# Patient Record
Sex: FEMALE | Race: WHITE | NOT HISPANIC OR LATINO | Employment: OTHER | URBAN - METROPOLITAN AREA
[De-identification: names, ages, dates, MRNs, and addresses within clinical notes are randomized per-mention and may not be internally consistent; named-entity substitution may affect disease eponyms.]

---

## 2017-02-07 ENCOUNTER — THERAPY VISIT (OUTPATIENT)
Dept: CHIROPRACTIC MEDICINE | Facility: CLINIC | Age: 52
End: 2017-02-07
Payer: COMMERCIAL

## 2017-02-07 DIAGNOSIS — M76.899 HAMSTRING TENDINITIS AT ORIGIN: ICD-10-CM

## 2017-02-07 DIAGNOSIS — M99.05 SOMATIC DYSFUNCTION OF PELVIS REGION: Primary | ICD-10-CM

## 2017-02-07 DIAGNOSIS — M25.551 HIP PAIN, RIGHT: ICD-10-CM

## 2017-02-07 DIAGNOSIS — M25.551 PAIN IN JOINT, PELVIC REGION AND THIGH, RIGHT: ICD-10-CM

## 2017-02-07 DIAGNOSIS — M79.10 MYALGIA: ICD-10-CM

## 2017-02-07 PROCEDURE — 98940 CHIROPRACT MANJ 1-2 REGIONS: CPT | Mod: AT | Performed by: CHIROPRACTOR

## 2017-02-07 PROCEDURE — 97110 THERAPEUTIC EXERCISES: CPT | Performed by: CHIROPRACTOR

## 2017-02-07 NOTE — MR AVS SNAPSHOT
"              After Visit Summary   2/7/2017    Qing Norris    MRN: [id number]           Patient Information     Date Of Birth          1965        Visit Information        Provider Department      2/7/2017 8:00 AM Mac Santos DC [place] [facility] - [facility] Chirop[facility]        Today's Diagnoses     Somatic dysfunction of pelvis region    -  1     Hip pain, right         Hamstring tendinitis at origin         Myalgia         Pain in joint, pelvic region and thigh, right            Follow-ups after your visit        Who to contact     If you have questions or need follow up information about today's clinic visit or your schedule please contact [facility]THLETIC [facility] directly at [phone].  Normal or non-critical lab and imaging results will be communicated to you by NetHookshart, letter or phone within 4 business days after the clinic has received the results. If you do not hear from us within 7 days, please contact the clinic through NetHookshart or phone. If you have a critical or abnormal lab result, we will notify you by phone as soon as possible.  Submit refill requests through Youxiduo or call your pharmacy and they will forward the refill request to us. Please allow 3 business days for your refill to be completed.          Additional Information About Your Visit        NetHooksharMotobuykers Information     Youxiduo lets you send messages to your doctor, view your test results, renew your prescriptions, schedule appointments and more. To sign up, go to [email or web address] . Click on \"Log in\" on the left side of the screen, which will take you to the Welcome page. Then click on \"Sign up Now\" on the right side of the page.     You will be asked to enter the access code listed below, as well as some personal information. Please follow the directions to create your username and password.     Your access code is: [email or web address]  Expires: 3/4/2017  8:12 AM     Your " access code will  in 90 days. If you need help or a new code, please call your [facility] clinic or [phone].        Care EveryWhere ID     This is your Care EveryWhere ID. This could be used by other organizations to access your [facility] medical records  [id number]         Blood Pressure from Last 3 Encounters:   16 154/94   07/29/15 110/60    Weight from Last 3 Encounters:   16 52.164 kg (115 lb)   07/29/15 52.164 kg (115 lb)              We Performed the Following     CHIROPRAC MANIP,SPINAL,1-2 REGIONS     THERAPEUTIC EXERCISES        Primary Care Provider Office Phone # Fax #    Keith Mckeon PA-C [phone]       [facility] [address]    [facility] [address]        Thank you!     Thank you for choosing [place] [facility]  [facility] [place] CH[facility]OPRA[facility]  for your care. Our goal is always to provide you with excellent care. Hearing back from our patients is one way we can continue to improve our services. Please take a few minutes to complete the written survey that you may receive in the mail after your visit with us. Thank you!             Your Updated Medication List - Protect others around you: Learn how to safely use, store and throw away your medicines at www.disposemymeds.org.          This list is accurate as of: 17  8:54 AM.  Always use your most recent med list.                   Brand Name Dispense Instructions for use    calcium carbonate 500 MG tablet    OS-EVANGELISTA 500 mg [place]. Ca     Take 500 mg by mouth 2 times daily       calcium polycarbophil 625 MG tablet    FIBERCON     Take 2 tablets by mouth daily       GLUCOSAMINE SULFATE PO          magnesium chloride 535 (64 MG) MG Tbcr CR tablet      Take 535 mg by mouth daily       OMEGA-3 FISH OIL PO          VITAMIN C PO          VITAMIN D (CHOLECALCIFEROL) PO      Take by mouth daily

## 2017-02-07 NOTE — PROGRESS NOTES
Subjective:  CC: B hips, B hamstring, B adductors   Visit: 25  Functional goal: be able to run, sit for 30 minutes without high hamstring discomfort   Location: general posterior hips and hamstring   Comes in today doing OK. Had headache from Nitro patches so went in and saw Dr. Lees. Notes though that got reading glasses and that helps. Notes doing PT with eccentric exercises. Notes working with [facility] for PT. Notes still not able to run fully without pain. Did run / walk. Notes B proximal adductors. Doing 45 minutes of cardio. Notes playing tennis fully. Still seeing Zaira for dry needling. Wants to go see Rafael as well for needling. I talked with her about seeing so many people and she notes it helps. I told it is hard to have treatment plans because she sees so many people. She agreed to this. Would like me to focus on bilateral lower legs.     Objective:  Inspection:  No SDD  No Scars  Normal Gait  No swelling     Palpation:  Palpable soreness over bilateral lower legs, B proximal adductors, B proximal hamstrings   Myofascitis 2/4 noted over B hamstrings, B proximal adductors     ROM:  Lumbar flexion   90/90 with tightness noted on B hamstring  Hip IR limited on left 6 degrees with no pain  Hamstring SLR shows mid hamstring discomfort at 70 degrees B  Hip flexion 5/5 with no pain  Hip abduction symmetric with discomfort over proximal adductors     MMT:  Left glute med 4/5with no pain  Right glute med 4/5 with no pain  TFL 4/5 B with no pain   Gracilis 5/5 B with mild discomfort over proximal adductors  Psoas 5/5 with anterior hip discomfort   Hamstring 4/5 with discomfort on right    MET:  Right short leg    Squat:  Shift to right  Foot flare to right  Arm drop on right  Early heel rise    NAL:  General SI restrictions B    Assessment:  NAL with associated myofascitis and weakness     Plan:    Patient tolerated treatment well today  Treatment Time: 45 minutes  84630 manipulation 1-2 segments: Hip  LAD B  67393 manipulation: Manual extremity  10931 Manual therapy: (ART, Graston, Strain Counter Strain, Fascial Manipulation, Cupping) performed over area of Bilateral hamstring and Bilateral lower legs  59065 therapeutic exercise (30 minutes):  Gastroc stretch, soleus stretch, hamstring PIR, hip ER stretching, eccentric calfs, clam shell exercises , inhibit with soft tissue inhibition with foam roller, stretch soleus, activate anterior tibialis, psoas stretch, prone hip extension, eccentric calf, Active Isolated Stretching of hamstrings : single leg RDL, straight leg bridges, lateral band walks, fire hydrant, donkey kicks    Today: review of PT exercises, nerve flossing, standing RDL, side lunge stretch  Strapping: inferior glide of hip   Shock wave: 20/12 over proximal hamstring (did not do today)  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

## 2017-07-21 ENCOUNTER — THERAPY VISIT (OUTPATIENT)
Dept: CHIROPRACTIC MEDICINE | Facility: CLINIC | Age: 52
End: 2017-07-21
Payer: COMMERCIAL

## 2017-07-21 DIAGNOSIS — M76.32 ILIOTIBIAL BAND SYNDROME OF BOTH SIDES: ICD-10-CM

## 2017-07-21 DIAGNOSIS — M79.10 MYALGIA: ICD-10-CM

## 2017-07-21 DIAGNOSIS — M76.31 ILIOTIBIAL BAND SYNDROME OF BOTH SIDES: ICD-10-CM

## 2017-07-21 DIAGNOSIS — M99.05 SOMATIC DYSFUNCTION OF PELVIS REGION: Primary | ICD-10-CM

## 2017-07-21 PROCEDURE — 97110 THERAPEUTIC EXERCISES: CPT | Performed by: CHIROPRACTOR

## 2017-07-21 PROCEDURE — 98940 CHIROPRACT MANJ 1-2 REGIONS: CPT | Mod: AT | Performed by: CHIROPRACTOR

## 2017-07-21 PROCEDURE — 99212 OFFICE O/P EST SF 10 MIN: CPT | Mod: 25 | Performed by: CHIROPRACTOR

## 2017-07-21 NOTE — MR AVS SNAPSHOT
"              After Visit Summary   2017    Qing Norris    MRN: [id number]           Patient Information     Date Of Birth          1965        Visit Information        Provider Department      2017 4:00 PM Mac Santos DC [employer] [facility] - [place] Chirop[facility]        Today's Diagnoses     Somatic dysfunction of pelvis region    -  1    Iliotibial band syndrome of both sides        Myalgia           Follow-ups after your visit        Who to contact     If you have questions or need follow up information about today's clinic visit or your schedule please contact [facility] [place] [facility] directly at [phone].  Normal or non-critical lab and imaging results will be communicated to you by OneSunhart, letter or phone within 4 business days after the clinic has received the results. If you do not hear from us within 7 days, please contact the clinic through OneSunhart or phone. If you have a critical or abnormal lab result, we will notify you by phone as soon as possible.  Submit refill requests through Ambient Clinical Analytics or call your pharmacy and they will forward the refill request to us. Please allow 3 business days for your refill to be completed.          Additional Information About Your Visit        MyChart Information     Ambient Clinical Analytics lets you send messages to your doctor, view your test results, renew your prescriptions, schedule appointments and more. To sign up, go to [email or web address] . Click on \"Log in\" on the left side of the screen, which will take you to the Welcome page. Then click on \"Sign up Now\" on the right side of the page.     You will be asked to enter the access code listed below, as well as some personal information. Please follow the directions to create your username and password.     Your access code is: [email or web address]  Expires: 10/20/2017 10:29 PM     Your access code will  in 90 days. If you need help or a new code, " please call your [facility] clinic or [phone].        Care EveryWhere ID     This is your Care EveryWhere ID. This could be used by other organizations to access your [facility] medical records  [id number]         Blood Pressure from Last 3 Encounters:   09/22/16 (!) 154/94   07/29/15 110/60    Weight from Last 3 Encounters:   09/22/16 52.2 kg (115 lb)   07/29/15 52.2 kg (115 lb)              We Performed the Following     CHIROPRAC MANIP,SPINAL,1-2 REGIONS     OFFICE/OUTPT VISIT,EST,LEVL II     THERAPEUTIC EXERCISES        Primary Care Provider Office Phone # Fax #    Keith Mckeon PA-C [phone]       [facility] [address]    [facility] [address]        Equal Access to Services     TOMASA BELTRAN : Hadii jeromy ku hadasho Soomaali, waaxda luqadaha, qaybta kaalmada adeegyada, amari paz . So [facility] [phone].    ATENCIÓN: Si habla español, tiene a spears disposición servicios gratuitos de asistencia lingüística. Nicolás al [phone].    We comply with applicable federal civil rights laws and [place] laws. We do not discriminate on the basis of race, color, national origin, age, disability sex, sexual orientation or gender identity.            Thank you!     Thank you for choosing [place] [facility]  [facility]  for your care. Our goal is always to provide you with excellent care. Hearing back from our patients is one way we can continue to improve our services. Please take a few minutes to complete the written survey that you may receive in the mail after your visit with us. Thank you!             Your Updated Medication List - Protect others around you: Learn how to safely use, store and throw away your medicines at www.disposemymeds.org.          This list is accurate as of: 7/21/17 11:59 PM.  Always use your most recent med list.                   Brand Name Dispense Instructions for use Diagnosis    calcium  carbonate 1250 MG tablet    OS-EVANGELISTA 500 mg [place]. Ca     Take 500 mg by mouth 2 times daily        calcium polycarbophil 625 MG tablet    FIBERCON     Take 2 tablets by mouth daily        GLUCOSAMINE SULFATE PO           magnesium chloride 535 (64 MG) MG Tbcr CR tablet      Take 535 mg by mouth daily        OMEGA-3 FISH OIL PO           VITAMIN C PO           VITAMIN D (CHOLECALCIFEROL) PO      Take by mouth daily

## 2017-07-21 NOTE — PROGRESS NOTES
Subjective:  CC: B IT band pain  Visit: 1  Functional goal: be able to run, stand for 30 minutes without thigh pain, bike for 30 minutes without knee pain  Location: B lateral knee pain, B thigh pain, mid thigh region  Pain: varies but 4/10 on average, worse with biking, has not been running  Previous: since last visit has worked with DC for B adductor strains, has done well. Also sees DC for regular adjustments  Radiation: Denies  MILTON: Denies any specific MILTON, notes gradual onset of pain over last 1.5 months  Other: Denies any changes in health history since last visit. Denies any medications.     Objective:  Inspection:  No SDD  No Scars  Normal Gait  No swelling     Palpation:  Palpable soreness over B thighs, B knees   Myofascitis 2/4 noted over B VL, B IT    ROM:  Lumbar flexion   90/90 with tightness noted on B hamstring  Hip IR limited on left 6 degrees with no pain  Hamstring SLR shows mid hamstring discomfort at 70 degrees B  Hip abduction symmetric with discomfort over proximal adductors   Hip Adduction symmetric and only tightness noted over B IT    MMT:  Left glute med 4/5with no pain  Right glute med 4/5 with no pain  TFL 4/5 B with no pain     MET:  Right short leg    Squat:  Shift to right  Foot flare to right  Arm drop on right  Early heel rise    NAL:  General SI restrictions B    Assessment:  NAL with associated myofascitis and weakness   B IT band pain    Plan:    Patient tolerated treatment well today  Treatment Time: 45 minutes  39832 manipulation 1-2 segments: Hip LAD B  08437 Manual therapy: (ART, Graston, Strain Counter Strain, Fascial Manipulation, Cupping) performed over area of B VL, B BF, B IT  23758 therapeutic exercise (30 minutes):  Gastroc stretch, soleus stretch, hamstring PIR, hip ER stretching, eccentric calfs, clam shell exercises , inhibit with soft tissue inhibition with foam roller, stretch soleus, activate anterior tibialis, psoas stretch, prone hip extension, eccentric calf,  Active Isolated Stretching of hamstrings : single leg RDL, straight leg bridges, lateral band walks, fire hydrant, donkey kicks    Today: TFL stretching, piriformis stretching  Strapping: inferior glide of hip   Next visit: RN as sees so many people and has not been consistent with care in past

## 2017-07-22 PROBLEM — M76.32 ILIOTIBIAL BAND SYNDROME OF BOTH SIDES: Status: ACTIVE | Noted: 2017-07-22

## 2017-07-22 PROBLEM — M99.05 SOMATIC DYSFUNCTION OF PELVIS REGION: Status: ACTIVE | Noted: 2017-07-22

## 2017-07-22 PROBLEM — M76.31 ILIOTIBIAL BAND SYNDROME OF BOTH SIDES: Status: ACTIVE | Noted: 2017-07-22

## 2017-07-22 PROBLEM — M79.10 MYALGIA: Status: ACTIVE | Noted: 2017-07-22

## 2017-12-11 ENCOUNTER — HOSPITAL ENCOUNTER (OUTPATIENT)
Dept: MAMMOGRAPHY | Facility: CLINIC | Age: 52
Discharge: HOME | End: 2017-12-11
Attending: OBSTETRICS & GYNECOLOGY | Admitting: OBSTETRICS & GYNECOLOGY
Payer: COMMERCIAL

## 2017-12-11 DIAGNOSIS — Z12.31 VISIT FOR SCREENING MAMMOGRAM: ICD-10-CM

## 2017-12-11 PROCEDURE — G0202 SCR MAMMO BI INCL CAD: HCPCS

## 2018-03-12 ENCOUNTER — THERAPY VISIT (OUTPATIENT)
Dept: CHIROPRACTIC MEDICINE | Facility: CLINIC | Age: 53
End: 2018-03-12
Payer: COMMERCIAL

## 2018-03-12 DIAGNOSIS — M99.05 SOMATIC DYSFUNCTION OF PELVIS REGION: Primary | ICD-10-CM

## 2018-03-12 DIAGNOSIS — M79.10 MYALGIA: ICD-10-CM

## 2018-03-12 DIAGNOSIS — M79.661 RIGHT CALF PAIN: ICD-10-CM

## 2018-03-12 DIAGNOSIS — M25.561 RIGHT KNEE PAIN: ICD-10-CM

## 2018-03-12 PROCEDURE — 99211 OFF/OP EST MAY X REQ PHY/QHP: CPT | Mod: 25 | Performed by: CHIROPRACTOR

## 2018-03-12 PROCEDURE — 98940 CHIROPRACT MANJ 1-2 REGIONS: CPT | Performed by: CHIROPRACTOR

## 2018-03-12 PROCEDURE — 97110 THERAPEUTIC EXERCISES: CPT | Performed by: CHIROPRACTOR

## 2018-03-12 NOTE — PROGRESS NOTES
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 11/2017 had right foot cramping and right lateral shin pain. Notes that had some right posterior knee pain. MRI at [facility] done that showed nothing. They diagnosed with Popliteus injury and saw Dr. Lees and did some needling in 1/2018. Notes Dr. Lees thought it was more grastroc tendinosis on lateral head. Last week did PT did pubic bone adjustment and notes some adductor discomfort since then. Saw DC and had pubic bone adjusted.     Objective:  Inspection:  No SDD  No Scars  Normal Gait  No swelling     Palpation:  Palpable soreness over R posterior knee, R calf  Myofascitis 2/4 noted over R lateral hamstring, R calf, R popliteus     ROM:  Lumbar flexion   90/90 with tightness noted on B hamstring  Hamstring SLR shows mid hamstring discomfort at 70 degrees B  Hip abduction very limited as patient is nervous to move because she notes it might hurt    MMT:  Left glute med 4/5with no pain  Right glute med 4/5 with no pain  TFL 4/5 B with no pain     MET:  Right short leg    Squat:  Shift to right  Foot flare to right  Arm drop on right  Early heel rise    NAL:  General SI restrictions R  Restricted fib head on right    Assessment:  NAL with associated myofascitis and weakness   Knee pain, proximal calf tendinitis     Plan:    Patient tolerated treatment well today  Treatment Time: 45 minutes  24487 manipulation 1-2 segments: Hip LAD R  91739 Manual therapy: (ART, Graston, Strain Counter Strain, Fascial Manipulation, Cupping) performed over area of R BF, R calf, R popliteus   11039 therapeutic exercise (30 minutes):  Gastroc stretch, soleus stretch, hamstring PIR, hip ER  stretching, eccentric calfs, clam shell exercises , inhibit with soft tissue inhibition with foam roller, stretch soleus, activate anterior tibialis, psoas stretch, prone hip extension, eccentric calf, Active Isolated Stretching of hamstrings : single leg RDL, straight leg bridges, lateral band walks, fire hydrant, donkey kicks    Today: Hamstring PIR, calf stretching  Plan: 3 visit overs 6 weeks  We spent 10 minutes discussing 1. She sees so many providers and gets conflicting information. Recommended she focuses on one course of treatment. 2. Has a fear of movement and needs to understand that it is OK to move

## 2018-03-16 ENCOUNTER — TELEPHONE (OUTPATIENT)
Dept: OTHER | Facility: CLINIC | Age: 53
End: 2018-03-16

## 2018-03-16 ENCOUNTER — MEDICAL CORRESPONDENCE (OUTPATIENT)
Dept: HEALTH INFORMATION MANAGEMENT | Facility: CLINIC | Age: 53
End: 2018-03-16

## 2018-03-16 ENCOUNTER — TRANSFERRED RECORDS (OUTPATIENT)
Dept: HEALTH INFORMATION MANAGEMENT | Facility: CLINIC | Age: 53
End: 2018-03-16

## 2018-03-16 NOTE — TELEPHONE ENCOUNTER
Referral received via fax.     Pt referred to [facility] by Dr. Mcdaniels for Potential popliteal entrapment syndrome, knee pain.     Pt called in, pt notes in November 2017 she was standing in [employer] and felt like someone took a knife to the back of her right knee. When sitting for too long or  sleeping in like a compressed position pain starts up again. Pt has bilateral leg, notes Left side not as bad. Right side impacts everyday tasks.     Compartment testing done at [facility].     Pt needs to be scheduled for consult with Dr. Cooper (referring provider requesting Dr. Cooper).  Will route to scheduling to coordinate an appointment at next available.    Carmela Lora, QUINNN, RN

## 2018-03-18 PROBLEM — M99.05 SOMATIC DYSFUNCTION OF PELVIS REGION: Status: RESOLVED | Noted: 2017-07-22 | Resolved: 2018-03-18

## 2018-03-18 PROBLEM — M99.05 SOMATIC DYSFUNCTION OF PELVIS REGION: Status: ACTIVE | Noted: 2018-03-18

## 2018-03-18 PROBLEM — M76.32 ILIOTIBIAL BAND SYNDROME OF BOTH SIDES: Status: RESOLVED | Noted: 2017-07-22 | Resolved: 2018-03-18

## 2018-03-18 PROBLEM — M79.661 RIGHT CALF PAIN: Status: ACTIVE | Noted: 2018-03-18

## 2018-03-18 PROBLEM — M25.561 RIGHT KNEE PAIN: Status: ACTIVE | Noted: 2018-03-18

## 2018-03-18 PROBLEM — M79.10 MYALGIA: Status: RESOLVED | Noted: 2017-07-22 | Resolved: 2018-03-18

## 2018-03-18 PROBLEM — M79.10 MYALGIA: Status: ACTIVE | Noted: 2018-03-18

## 2018-03-18 PROBLEM — M76.31 ILIOTIBIAL BAND SYNDROME OF BOTH SIDES: Status: RESOLVED | Noted: 2017-07-22 | Resolved: 2018-03-18

## 2018-03-29 ENCOUNTER — OFFICE VISIT (OUTPATIENT)
Dept: OTHER | Facility: CLINIC | Age: 53
End: 2018-03-29
Attending: SURGERY
Payer: COMMERCIAL

## 2018-03-29 VITALS
WEIGHT: 117 LBS | HEIGHT: 64 IN | HEART RATE: 77 BPM | DIASTOLIC BLOOD PRESSURE: 78 MMHG | BODY MASS INDEX: 19.97 KG/M2 | SYSTOLIC BLOOD PRESSURE: 113 MMHG

## 2018-03-29 DIAGNOSIS — M25.561 CHRONIC PAIN OF RIGHT KNEE: Primary | ICD-10-CM

## 2018-03-29 DIAGNOSIS — I77.89 POPLITEAL ARTERY ENTRAPMENT SYNDROME (H): Primary | ICD-10-CM

## 2018-03-29 DIAGNOSIS — G89.29 CHRONIC PAIN OF RIGHT KNEE: Primary | ICD-10-CM

## 2018-03-29 PROCEDURE — G0463 HOSPITAL OUTPT CLINIC VISIT: HCPCS

## 2018-03-29 PROCEDURE — 99203 OFFICE O/P NEW LOW 30 MIN: CPT | Mod: ZP | Performed by: SURGERY

## 2018-03-29 NOTE — MR AVS SNAPSHOT
After Visit Summary   3/29/2018    Qing Norris    MRN: [id number]           Patient Information     Date Of Birth          1965        Visit Information        Provider Department      3/29/2018 2:00 PM Pipo Cooper MD [facility]  [address]      Today's Diagnoses     Chronic pain of right knee    -  1       Follow-ups after your visit        Your next 10 appointments already scheduled     Mar 30, 2018 10:30 AM CDT   US CARYN DOPPLER [facility]ISE [facility]ATER[facility] [place] [address] [facility] ([facility])    [address]  [place] [address]   [phone]           Please bring a list of your medicines (including vitamins, minerals and over-the-counter drugs). Also, tell your doctor about any allergies you may have. Wear comfortable clothes and leave your valuables at home.  No caffeine or tobacco for 1 hour prior to exam.  Please call the Imaging Department at your exam site with any questions.            Mar 30, 2018 11:00 AM CDT   US LOWER EXTREMITY ARTERIAL DUPLEX RIGHT [facility]   [facility] ([facility])    [address]  [place] [address]   [phone]           Please bring a list of your medicines (including vitamins, minerals and over-the-counter drugs). Also, tell your doctor about any allergies you may have. Wear comfortable clothes and leave your valuables at home.  You do not need to do anything special to prepare for your exam.  Please call the Imaging Department at your exam site with any questions.            Mar 30, 2018 11:30 AM CDT   US LOWER EXTREMITY VENOUS DUPLEX RIGHT [facility]   [facility]ound ([facility])    [address]  [facility] [address]   [phone]           Please bring a list of  "your medicines (including vitamins, minerals and over-the-counter drugs). Also, tell your doctor about any allergies you may have. Wear comfortable clothes and leave your valuables at home.  You do not need to do anything special to prepare for your exam.  Please call the Imaging Department at your exam site with any questions.              Future tests that were ordered for you today     Open Future Orders        Priority Expected Expires Ordered    US Lower Extremity Arterial Duplex Right Routine 3/29/2018 3/29/2019 3/29/2018    US Lower Extremity Venous Duplex Right Routine 3/29/2018 3/29/2019 3/29/2018    US CARYN Doppler with Exercise Bilateral Routine 3/29/2018 3/29/2019 3/29/2018            Who to contact     If you have questions or need follow up information about today's clinic visit or your schedule please contact [facility] VASC[facility] [place] directly at [phone].  Normal or non-critical lab and imaging results will be communicated to you by MyChart, letter or phone within 4 business days after the clinic has received the results. If you do not hear from us within 7 days, please contact the clinic through SavvyCardhart or phone. If you have a critical or abnormal lab result, we will notify you by phone as soon as possible.  Submit refill requests through ThumbAd or call your pharmacy and they will forward the refill request to us. Please allow 3 business days for your refill to be completed.          Additional Information About Your Visit        SavvyCardhart Information     ThumbAd lets you send messages to your doctor, view your test results, renew your prescriptions, schedule appointments and more. To sign up, go to [email or web address] . Click on \"Log in\" on the left side of the screen, which will take you to the Welcome page. Then click on \"Sign up Now\" on the right side of the page.     You will be asked to enter the access code listed below, as well as some personal information. Please follow the " "directions to create your username and password.     Your access code is: [email or web address]  Expires: 2018  6:25 PM     Your access code will  in 90 days. If you need help or a new code, please call your [facility] clinic or [phone].        Care EveryWhere ID     This is your Care EveryWhere ID. This could be used by other organizations to access your [facility] medical records  [id number]        Your Vitals Were     Pulse Height BMI (Body Mass Index)             77 5' 4\" (1.626 m) 20.08 kg/m2          Blood Pressure from Last 3 Encounters:   18 113/78   16 (!) 154/94   07/29/15 110/60    Weight from Last 3 Encounters:   18 117 lb (53.1 kg)   16 115 lb (52.2 kg)   07/29/15 115 lb (52.2 kg)              Today, you had the following     No orders found for display       Primary Care Provider Office Phone # Fax #    Keith Mckeon PA-C [phone]       [facility] [address] [place] [address]        Equal Access to Services     TOMASA BELTRAN : Hadii aad ku hadasho Soomaali, waaxda luqadaha, qaybta kaalmada adeegyada, waxay samirain hayjaxsonn ernestina paz . So [facility] [phone].    ATENCIÓN: Si habla español, tiene a spears disposición servicios gratuitos de asistencia lingüística. Llame al [phone].    We comply with applicable federal civil rights laws and [place] laws. We do not discriminate on the basis of race, color, national origin, age, disability, sex, sexual orientation, or gender identity.            Thank you!     Thank you for choosing [facility] [place]  for your care. Our goal is always to provide you with excellent care. Hearing back from our patients is one way we can continue to improve our services. Please take a few minutes to complete the written survey that you may receive in the mail after your visit with us. Thank you!             Your Updated Medication List - Protect others around you: Learn how to " safely use, store and throw away your medicines at www.disposemymeds.org.          This list is accurate as of 3/29/18  6:25 PM.  Always use your most recent med list.                   Brand Name Dispense Instructions for use Diagnosis    calcium carbonate 1250 MG tablet    OS-EVANGELISTA 500 mg [place]. Ca     Take 500 mg by mouth 2 times daily        calcium polycarbophil 625 MG tablet    FIBERCON     Take 2 tablets by mouth daily        GLUCOSAMINE SULFATE PO           magnesium chloride 535 (64 MG) MG Tbcr CR tablet      Take 535 mg by mouth daily        OMEGA-3 FISH OIL PO           VITAMIN C PO           VITAMIN D (CHOLECALCIFEROL) PO      Take by mouth daily

## 2018-03-29 NOTE — PROGRESS NOTES
[place] [facility]    Qing Norris her  referred to see me today by Dr. Floyd Mcdaniels of [facility].    This 52-year-old very healthy active woman is an avid .  She had injured her right knee 3-4 weeks before the onset of the present symptoms.  MRI was done at that time in October 2017 which was unremarkable.  In November 2017 while standing she started noticing pain in the right popliteal region.  This is in the midportion and was moderately severe.  This was not associated with any specific injury.    She noted that after 10-20 minutes of exercise she would notice some tenderness in both posterior calfs.  She is also noted some cramping and numbness to the right great toe.  She has no problems in the left leg.    She had a normal MRI of the knee and calf region.  She has tried nitroglycerin patch to the popliteal region with minimal help.  She is undergone ultrasound and the peeling to no effect.  He tried a compression sleeve but felt that this was causing distal swelling and actually more pain.  She has not been able to run resume her tennis due to the ongoing problems.      She did think she was getting slightly better but a recent hot tub made the pain even worse localized in the mid popliteal region.      PMH: Medications: Glucosamine, vitamin supplements            Surgery: 1998 repair of a torn left ACL and meniscus with good results            No underlying medical problems.            Non-smoker.            Extremely active prior to the present problem.    ROS: Reports in the past with bicycling that she had problems with both soleus muscles that resolved.      Exam: Very healthy petite woman.  Blood pressure 115/78 left arm and 113/70 right arm.  Pulse 77 regular.  Weight= 117 pounds  Chest= clear.   Cardiovascular=RR  Extremities= no swelling, normal sensation, no varicosities, no calf tenderness.  Notes tenderness with palpation of the right mid popliteal region with no  palpable masses.  Nontender on left.  +3 dorsalis pedis and posterior tibial pulses bilaterally.    I reviewed Dr. Nelson chart notes that he is provided.  MRI was also reviewed which is normal.  She does have a prominent medial head of the gastrocnemius muscle which is a very common finding and not necessarily diagnostic.  The arterial anatomy is otherwise normal.      Impression: Very atypical finding for any nerve or vascular compression in the popliteal regions that one would know with popliteal artery entrapment syndrome.  She did have some symptoms earlier of posterior calf pain associated with activity but certainly could represent the syndrome but would not explain her point tenderness in the popliteal region.  She is very frustrated with the ongoing issues and inability to return to her normal athletic activities.  We will thus evaluate this further with an CARYN with exercise and dynamic popliteal duplex ultrasound to see if there is any type of compressive syndrome noted.  I did inform her and her  during our 25 minute office visit today with over 50% counseling of the very atypical presentation and that we may not find any specific etiology to her problem.      Pipo Cooper MD     Please route or send letter to:  Dr. Floyd Mcdaniels    [facility]

## 2018-03-29 NOTE — NURSING NOTE
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 Mcdaniels for popliteal entrapment syndrome. Progress note in nurse office       Initial /78 (BP Location: Right arm, Patient Position: Chair, Cuff Size: Adult Regular)  Pulse 77  Ht 5' 4\" (1.626 m)  Wt 117 lb (53.1 kg)  BMI 20.08 kg/m2 Estimated body mass index is 20.08 kg/(m^2) as calculated from the following:    Height as of this encounter: 5' 4\" (1.626 m).    Weight as of this encounter: 117 lb (53.1 kg).  Medication Reconciliation: complete    Jeniffer Loyola CMA on 3/29/2018 at 2:05 PM    "

## 2018-03-29 NOTE — LETTER
[facility]  [address]  [place] [address]  Phone: [phone]  Fax: [phone]    2018    Re: Qing Norris, : 1965    [place]     Qing Norris her  referred to see me today by Dr. Floyd Mcdaniels of [facility].     This 52-year-old very healthy active woman is an avid .  She had injured her right knee 3-4 weeks before the onset of the present symptoms.  MRI was done at that time in 2017 which was unremarkable.  In 2017 while standing she started noticing pain in the right popliteal region.  This is in the midportion and was moderately severe.  This was not associated with any specific injury.     She noted that after 10-20 minutes of exercise she would notice some tenderness in both posterior calfs.  She is also noted some cramping and numbness to the right great toe.  She has no problems in the left leg.     She had a normal MRI of the knee and calf region.  She has tried nitroglycerin patch to the popliteal region with minimal help.  She is undergone ultrasound and the peeling to no effect. He tried a compression sleeve but felt that this was causing distal swelling and actually more pain.  She has not been able to run resume her tennis due to the ongoing problems.     She did think she was getting slightly better but a recent hot tub made the pain even worse localized in the mid popliteal region.       PMH: Medications: Glucosamine, vitamin supplements            Surgery:  repair of a torn left ACL and meniscus with good results            No underlying medical problems.            Non-smoker.            Extremely active prior to the present problem.     ROS: Reports in the past with bicycling that she had problems with both soleus muscles that resolved.      Exam: Very healthy petite woman.  Blood pressure 115/78 left arm and 113/70 right arm. Pulse 77 regular.  Weight= 117  pounds  Chest= clear.   Cardiovascular=RR  Extremities= no swelling, normal sensation, no varicosities, no calf tenderness.  Notes tenderness with palpation of the right mid popliteal region with no palpable masses.  Nontender on left.  +3 dorsalis pedis and posterior tibial pulses bilaterally.     I reviewed Dr. Nelson chart notes that he is provided.  MRI was also reviewed which is normal. She does have a prominent medial head of the gastrocnemius muscle which is a very common finding and not necessarily diagnostic.  The arterial anatomy is otherwise normal.     Impression: Very atypical finding for any nerve or vascular compression in the popliteal regions that one would know with popliteal artery entrapment syndrome.  She did have some symptoms earlier of posterior calf pain associated with activity but certainly could represent the syndrome but would not explain her point tenderness in the popliteal region.  She is very frustrated with the ongoing issues and inability to return to her normal athletic activities.  We will thus evaluate this further with an CARYN with exercise and dynamic popliteal duplex ultrasound to see if there is any type of compressive syndrome noted.  I did inform her and her  during our 25 minute office visit today with over 50% counseling of the very atypical presentation and that we may not find any specific etiology to her problem.     Pipo Cooper MD

## 2018-03-30 ENCOUNTER — HOSPITAL ENCOUNTER (OUTPATIENT)
Dept: ULTRASOUND IMAGING | Facility: CLINIC | Age: 53
Discharge: HOME | End: 2018-03-30
Attending: SURGERY | Admitting: SURGERY
Payer: COMMERCIAL

## 2018-03-30 ENCOUNTER — HOSPITAL ENCOUNTER (OUTPATIENT)
Dept: ULTRASOUND IMAGING | Facility: CLINIC | Age: 53
End: 2018-03-30
Attending: SURGERY
Payer: COMMERCIAL

## 2018-03-30 DIAGNOSIS — I77.89 POPLITEAL ARTERY ENTRAPMENT SYNDROME (H): ICD-10-CM

## 2018-03-30 PROCEDURE — 93924 LWR XTR VASC STDY BILAT: CPT

## 2018-03-30 PROCEDURE — 93971 EXTREMITY STUDY: CPT | Mod: RT

## 2018-03-30 PROCEDURE — 93926 LOWER EXTREMITY STUDY: CPT | Mod: RT

## 2018-03-31 ENCOUNTER — TELEPHONE (OUTPATIENT)
Dept: OTHER | Facility: CLINIC | Age: 53
End: 2018-03-31

## 2018-03-31 NOTE — TELEPHONE ENCOUNTER
[place] [facility]    I called Qing Norris about her vascular testing performed yesterday after our office visit.  She has had ongoing pain in her right posterior popliteal region since November 2017.  Workup including MRI has not been diagnostic.  Therapy also was not given her relief.    We performed CARYN with exercise, evaluation of the popliteal arteries and veins at rest and with maneuvers.  All these testing was normal.      Her symptoms as we discussed in the office were not typical for an entrapment syndrome in the popliteal region and these testings being normal confirm that this is a very unlikely possibility.    Testing did not reveal a Baker's cyst or other focal potential etiology.  She asked whether this could be a problem with her back to live but expect her to have more radiating pain from the back down her leg instead of the focal popliteal pain.  Specific injury to the tibial nerves would also be unlikely.    She is obviously frustrated by the ongoing symptoms and lack of the diagnosis or improvement with conservative measures.  Unfortunately cannot help her further and she understands this after our discussion today.      Pipo Cooper MD

## 2018-12-21 ENCOUNTER — HOSPITAL ENCOUNTER (OUTPATIENT)
Dept: MAMMOGRAPHY | Facility: CLINIC | Age: 53
Discharge: HOME | End: 2018-12-21
Attending: OBSTETRICS & GYNECOLOGY | Admitting: OBSTETRICS & GYNECOLOGY
Payer: COMMERCIAL

## 2018-12-21 DIAGNOSIS — Z12.31 VISIT FOR SCREENING MAMMOGRAM: ICD-10-CM

## 2018-12-21 PROCEDURE — 77063 BREAST TOMOSYNTHESIS BI: CPT

## 2019-10-07 ENCOUNTER — APPOINTMENT (OUTPATIENT)
Age: 54
Setting detail: DERMATOLOGY
End: 2019-10-07

## 2019-10-07 DIAGNOSIS — Z41.9 ENCOUNTER FOR PROCEDURE FOR PURPOSES OTHER THAN REMEDYING HEALTH STATE, UNSPECIFIED: ICD-10-CM

## 2019-10-07 PROCEDURE — OTHER RECOMMENDATIONS: OTHER

## 2019-10-07 NOTE — PROCEDURE: RECOMMENDATIONS
Recommendation Preamble: NEW Patient - Cosmetic Consult\\n\\nPrevious Cosmetic Treatment: none. \\n\\nGoals: \\n1) jowl area\\n2) neck\\n\\nAssessed the patient's face with the patient while holding a hand held mirror.  \\nDiscussed the anatomy and anatomical changes of the skin associated with and due to the chronological aging process.\\n\\nPre-existing considerations to note: none
Detail Level: Zone

## 2019-12-26 ENCOUNTER — HOSPITAL ENCOUNTER (OUTPATIENT)
Dept: MAMMOGRAPHY | Facility: CLINIC | Age: 54
Discharge: HOME | End: 2019-12-26
Attending: OBSTETRICS & GYNECOLOGY | Admitting: OBSTETRICS & GYNECOLOGY
Payer: COMMERCIAL

## 2019-12-26 DIAGNOSIS — Z12.31 VISIT FOR SCREENING MAMMOGRAM: ICD-10-CM

## 2019-12-26 PROCEDURE — 77063 BREAST TOMOSYNTHESIS BI: CPT

## 2020-03-02 ENCOUNTER — APPOINTMENT (OUTPATIENT)
Age: 55
Setting detail: DERMATOLOGY
End: 2020-03-02

## 2020-03-02 DIAGNOSIS — Z41.9 ENCOUNTER FOR PROCEDURE FOR PURPOSES OTHER THAN REMEDYING HEALTH STATE, UNSPECIFIED: ICD-10-CM

## 2020-03-02 PROCEDURE — OTHER THERMAGE FLX: OTHER

## 2020-03-02 ASSESSMENT — LOCATION ZONE DERM: LOCATION ZONE: NOSE

## 2020-03-02 ASSESSMENT — LOCATION SIMPLE DESCRIPTION DERM: LOCATION SIMPLE: NOSE

## 2020-03-02 ASSESSMENT — LOCATION DETAILED DESCRIPTION DERM: LOCATION DETAILED: NASAL ROOT

## 2020-03-02 NOTE — PROCEDURE: THERMAGE FLX
Post-Procedure Text: ***DOCUMENTED ON PAPER - SEE SCANNED DOCUMENTS***\\n\\nTHERMAGE FLX (Face & Neck) TREATMENT Performed Today \\n

## 2020-03-02 NOTE — PROCEDURE: THERMAGE FLX
Price (Use Numbers Only, No Special Characters Or $): 1760 Price (Use Numbers Only, No Special Characters Or $): 9634

## 2020-08-05 ENCOUNTER — APPOINTMENT (OUTPATIENT)
Dept: URBAN - METROPOLITAN AREA CLINIC 260 | Age: 55
Setting detail: DERMATOLOGY
End: 2020-08-05

## 2020-08-05 DIAGNOSIS — L82.1 OTHER SEBORRHEIC KERATOSIS: ICD-10-CM

## 2020-08-05 DIAGNOSIS — D22 MELANOCYTIC NEVI: ICD-10-CM

## 2020-08-05 DIAGNOSIS — D18.0 HEMANGIOMA: ICD-10-CM

## 2020-08-05 DIAGNOSIS — Z71.89 OTHER SPECIFIED COUNSELING: ICD-10-CM

## 2020-08-05 DIAGNOSIS — L21.8 OTHER SEBORRHEIC DERMATITIS: ICD-10-CM

## 2020-08-05 DIAGNOSIS — L81.4 OTHER MELANIN HYPERPIGMENTATION: ICD-10-CM

## 2020-08-05 DIAGNOSIS — L81.3 CAFÉ AU LAIT SPOTS: ICD-10-CM

## 2020-08-05 PROBLEM — D22.5 MELANOCYTIC NEVI OF TRUNK: Status: ACTIVE | Noted: 2020-08-05

## 2020-08-05 PROBLEM — D18.01 HEMANGIOMA OF SKIN AND SUBCUTANEOUS TISSUE: Status: ACTIVE | Noted: 2020-08-05

## 2020-08-05 PROCEDURE — OTHER COUNSELING: OTHER

## 2020-08-05 PROCEDURE — 99214 OFFICE O/P EST MOD 30 MIN: CPT

## 2020-08-05 PROCEDURE — OTHER PRESCRIPTION: OTHER

## 2020-08-05 RX ORDER — TACROLIMUS 1 MG/G
OINTMENT TOPICAL
Qty: 1 | Refills: 0 | Status: ERX | COMMUNITY
Start: 2020-08-05

## 2020-08-05 ASSESSMENT — LOCATION SIMPLE DESCRIPTION DERM
LOCATION SIMPLE: UPPER BACK
LOCATION SIMPLE: LEFT THIGH

## 2020-08-05 ASSESSMENT — LOCATION ZONE DERM
LOCATION ZONE: LEG
LOCATION ZONE: TRUNK

## 2020-08-05 ASSESSMENT — LOCATION DETAILED DESCRIPTION DERM
LOCATION DETAILED: LEFT ANTERIOR PROXIMAL THIGH
LOCATION DETAILED: LEFT ANTERIOR DISTAL THIGH
LOCATION DETAILED: INFERIOR THORACIC SPINE

## 2020-08-21 ENCOUNTER — RX ONLY (RX ONLY)
Age: 55
End: 2020-08-21

## 2020-08-21 RX ORDER — PIMECROLIMUS 10 MG/G
CREAM TOPICAL
Qty: 1 | Refills: 0 | Status: ERX | COMMUNITY
Start: 2020-08-20

## 2021-01-22 ENCOUNTER — HOSPITAL ENCOUNTER (OUTPATIENT)
Dept: MAMMOGRAPHY | Facility: CLINIC | Age: 56
Discharge: HOME | End: 2021-01-22
Attending: OBSTETRICS & GYNECOLOGY | Admitting: OBSTETRICS & GYNECOLOGY
Payer: COMMERCIAL

## 2021-01-22 PROCEDURE — 77063 BREAST TOMOSYNTHESIS BI: CPT

## 2021-09-01 ENCOUNTER — APPOINTMENT (OUTPATIENT)
Dept: URBAN - METROPOLITAN AREA CLINIC 260 | Age: 56
Setting detail: DERMATOLOGY
End: 2021-09-01

## 2021-09-01 VITALS — WEIGHT: 117 LBS | HEIGHT: 64 IN | RESPIRATION RATE: 17 BRPM

## 2021-09-01 DIAGNOSIS — Z71.89 OTHER SPECIFIED COUNSELING: ICD-10-CM

## 2021-09-01 DIAGNOSIS — L81.3 CAFÉ AU LAIT SPOTS: ICD-10-CM

## 2021-09-01 DIAGNOSIS — D18.0 HEMANGIOMA: ICD-10-CM

## 2021-09-01 DIAGNOSIS — D22 MELANOCYTIC NEVI: ICD-10-CM

## 2021-09-01 DIAGNOSIS — L82.1 OTHER SEBORRHEIC KERATOSIS: ICD-10-CM

## 2021-09-01 DIAGNOSIS — L81.4 OTHER MELANIN HYPERPIGMENTATION: ICD-10-CM

## 2021-09-01 DIAGNOSIS — L21.8 OTHER SEBORRHEIC DERMATITIS: ICD-10-CM

## 2021-09-01 PROBLEM — D18.01 HEMANGIOMA OF SKIN AND SUBCUTANEOUS TISSUE: Status: ACTIVE | Noted: 2021-09-01

## 2021-09-01 PROBLEM — D22.5 MELANOCYTIC NEVI OF TRUNK: Status: ACTIVE | Noted: 2021-09-01

## 2021-09-01 PROCEDURE — OTHER COUNSELING: OTHER

## 2021-09-01 PROCEDURE — OTHER PRESCRIPTION: OTHER

## 2021-09-01 PROCEDURE — 99213 OFFICE O/P EST LOW 20 MIN: CPT

## 2021-09-01 RX ORDER — PIMECROLIMUS 10 MG/G
1% CREAM TOPICAL BID
Qty: 30 | Refills: 3 | Status: ERX | COMMUNITY
Start: 2021-09-01

## 2021-09-01 ASSESSMENT — LOCATION DETAILED DESCRIPTION DERM
LOCATION DETAILED: RIGHT CYMBA CONCHA
LOCATION DETAILED: LEFT BUTTOCK
LOCATION DETAILED: LEFT CRUS OF HELIX
LOCATION DETAILED: INFERIOR THORACIC SPINE

## 2021-09-01 ASSESSMENT — LOCATION SIMPLE DESCRIPTION DERM
LOCATION SIMPLE: LEFT EAR
LOCATION SIMPLE: LEFT BUTTOCK
LOCATION SIMPLE: UPPER BACK
LOCATION SIMPLE: RIGHT EAR

## 2021-09-01 ASSESSMENT — LOCATION ZONE DERM
LOCATION ZONE: EAR
LOCATION ZONE: TRUNK

## 2022-01-25 ENCOUNTER — HOSPITAL ENCOUNTER (OUTPATIENT)
Dept: MAMMOGRAPHY | Facility: CLINIC | Age: 57
Discharge: HOME | End: 2022-01-25
Attending: OBSTETRICS & GYNECOLOGY | Admitting: OBSTETRICS & GYNECOLOGY
Payer: COMMERCIAL

## 2022-01-25 DIAGNOSIS — Z12.31 VISIT FOR SCREENING MAMMOGRAM: ICD-10-CM

## 2022-01-25 PROCEDURE — 77067 SCR MAMMO BI INCL CAD: CPT

## 2022-07-07 ENCOUNTER — TELEPHONE (OUTPATIENT)
Dept: CONSULT | Facility: CLINIC | Age: 57
End: 2022-07-07

## 2022-07-07 NOTE — TELEPHONE ENCOUNTER
I returned a telephone call from Qing Norris regarding questions she had about genetic testing.  Sherin herself has a history of autoimmune and connective tissue type symptoms.  Qing reported that a friend with similar symptoms recently received a specific connective tissue disorder diagnosis through genetics.  I explained that connective tissue disorders are a large group of conditions, many of which are not genetic but are instead multifactorial.  Sherin agreed that she feels her symptoms likely do have an environmental cause.  I inquired further about Qing's symptoms and family history to ensure there were no other red flags suggesting a genetic connective tissue disorder and Sherin disclosed that both her mother and sister have had kidney cancer.  Genetic counseling and testing may be indicated for these individuals and I encouraged Qing to mention this to her sister to discuss with her providers.  At the conclusion of our call Qing had no additional questions.  She was encouraged to discuss her symptoms and any new symptoms with her primary care provider in the event a genetics referral may be indicated in the future.       Vianca Vo MS [facility]  Genetic Counselor  Division of Genetics and Metabolism

## 2022-12-06 ENCOUNTER — APPOINTMENT (OUTPATIENT)
Dept: URBAN - METROPOLITAN AREA CLINIC 260 | Age: 57
Setting detail: DERMATOLOGY
End: 2022-12-06

## 2022-12-06 VITALS — WEIGHT: 117 LBS | HEIGHT: 64 IN

## 2022-12-06 DIAGNOSIS — Z71.89 OTHER SPECIFIED COUNSELING: ICD-10-CM

## 2022-12-06 DIAGNOSIS — L81.4 OTHER MELANIN HYPERPIGMENTATION: ICD-10-CM

## 2022-12-06 DIAGNOSIS — L82.1 OTHER SEBORRHEIC KERATOSIS: ICD-10-CM

## 2022-12-06 DIAGNOSIS — D22 MELANOCYTIC NEVI: ICD-10-CM

## 2022-12-06 DIAGNOSIS — L21.8 OTHER SEBORRHEIC DERMATITIS: ICD-10-CM

## 2022-12-06 DIAGNOSIS — D18.0 HEMANGIOMA: ICD-10-CM

## 2022-12-06 PROBLEM — D18.01 HEMANGIOMA OF SKIN AND SUBCUTANEOUS TISSUE: Status: ACTIVE | Noted: 2022-12-06

## 2022-12-06 PROBLEM — D22.5 MELANOCYTIC NEVI OF TRUNK: Status: ACTIVE | Noted: 2022-12-06

## 2022-12-06 PROCEDURE — OTHER MIPS QUALITY: OTHER

## 2022-12-06 PROCEDURE — OTHER COUNSELING: OTHER

## 2022-12-06 PROCEDURE — 99213 OFFICE O/P EST LOW 20 MIN: CPT

## 2022-12-06 PROCEDURE — OTHER PRESCRIPTION MEDICATION MANAGEMENT: OTHER

## 2022-12-06 PROCEDURE — OTHER PRESCRIPTION: OTHER

## 2022-12-06 RX ORDER — PIMECROLIMUS 10 MG/G
1% CREAM TOPICAL BID
Qty: 30 | Refills: 3

## 2022-12-06 ASSESSMENT — LOCATION DETAILED DESCRIPTION DERM
LOCATION DETAILED: LEFT CRUS OF HELIX
LOCATION DETAILED: RIGHT CYMBA CONCHA
LOCATION DETAILED: INFERIOR THORACIC SPINE

## 2022-12-06 ASSESSMENT — LOCATION SIMPLE DESCRIPTION DERM
LOCATION SIMPLE: UPPER BACK
LOCATION SIMPLE: RIGHT EAR
LOCATION SIMPLE: LEFT EAR

## 2022-12-06 ASSESSMENT — LOCATION ZONE DERM
LOCATION ZONE: TRUNK
LOCATION ZONE: EAR

## 2022-12-06 NOTE — PROCEDURE: PRESCRIPTION MEDICATION MANAGEMENT
Render In Strict Bullet Format?: No
Plan: Pt admits to picking at the area
Detail Level: Zone
Continue Regimen: Elidel 1 % topical cream BID

## 2022-12-06 NOTE — HPI: EVALUATION OF SKIN LESION(S)
How Severe Are Your Spot(S)?: mild
Hpi Title: Evaluation of Skin Lesions
Additional History: FBE
Year Removed: 1900

## 2023-01-09 ENCOUNTER — ANCILLARY ORDERS (OUTPATIENT)
Dept: URGENT CARE | Facility: URGENT CARE | Age: 58
End: 2023-01-09

## 2023-01-09 DIAGNOSIS — Z12.31 ENCOUNTER FOR SCREENING MAMMOGRAM FOR MALIGNANT NEOPLASM OF BREAST: ICD-10-CM

## 2023-02-03 RX ORDER — PIMECROLIMUS 10 MG/G
1% CREAM TOPICAL BID
Qty: 30 | Refills: 3 | Status: ERX

## 2023-05-09 PROBLEM — Z00.00 ENCOUNTER FOR PREVENTIVE HEALTH EXAMINATION: Status: ACTIVE | Noted: 2023-05-09

## 2023-05-10 ENCOUNTER — APPOINTMENT (OUTPATIENT)
Dept: ORTHOPEDIC SURGERY | Facility: CLINIC | Age: 58
End: 2023-05-10

## 2023-10-16 ENCOUNTER — ANCILLARY PROCEDURE (OUTPATIENT)
Dept: MAMMOGRAPHY | Facility: CLINIC | Age: 58
End: 2023-10-16
Attending: PHYSICIAN ASSISTANT
Payer: COMMERCIAL

## 2023-10-16 DIAGNOSIS — Z12.31 ENCOUNTER FOR SCREENING MAMMOGRAM FOR MALIGNANT NEOPLASM OF BREAST: ICD-10-CM

## 2023-10-16 PROCEDURE — 77067 SCR MAMMO BI INCL CAD: CPT | Mod: TC | Performed by: STUDENT IN AN ORGANIZED HEALTH CARE EDUCATION/TRAINING PROGRAM

## 2023-11-09 ENCOUNTER — APPOINTMENT (OUTPATIENT)
Dept: URBAN - METROPOLITAN AREA CLINIC 256 | Age: 58
Setting detail: DERMATOLOGY
End: 2023-11-09

## 2023-11-09 VITALS — HEIGHT: 64 IN | WEIGHT: 120 LBS

## 2023-11-09 DIAGNOSIS — L21.8 OTHER SEBORRHEIC DERMATITIS: ICD-10-CM

## 2023-11-09 DIAGNOSIS — L57.8 OTHER SKIN CHANGES DUE TO CHRONIC EXPOSURE TO NONIONIZING RADIATION: ICD-10-CM

## 2023-11-09 DIAGNOSIS — Z71.89 OTHER SPECIFIED COUNSELING: ICD-10-CM

## 2023-11-09 DIAGNOSIS — L82.1 OTHER SEBORRHEIC KERATOSIS: ICD-10-CM

## 2023-11-09 DIAGNOSIS — Z41.9 ENCOUNTER FOR PROCEDURE FOR PURPOSES OTHER THAN REMEDYING HEALTH STATE, UNSPECIFIED: ICD-10-CM

## 2023-11-09 DIAGNOSIS — D22 MELANOCYTIC NEVI: ICD-10-CM

## 2023-11-09 DIAGNOSIS — L57.0 ACTINIC KERATOSIS: ICD-10-CM

## 2023-11-09 DIAGNOSIS — L28.1 PRURIGO NODULARIS: ICD-10-CM

## 2023-11-09 DIAGNOSIS — D18.0 HEMANGIOMA: ICD-10-CM

## 2023-11-09 PROBLEM — D22.62 MELANOCYTIC NEVI OF LEFT UPPER LIMB, INCLUDING SHOULDER: Status: ACTIVE | Noted: 2023-11-09

## 2023-11-09 PROBLEM — D22.72 MELANOCYTIC NEVI OF LEFT LOWER LIMB, INCLUDING HIP: Status: ACTIVE | Noted: 2023-11-09

## 2023-11-09 PROBLEM — D22.61 MELANOCYTIC NEVI OF RIGHT UPPER LIMB, INCLUDING SHOULDER: Status: ACTIVE | Noted: 2023-11-09

## 2023-11-09 PROBLEM — D18.01 HEMANGIOMA OF SKIN AND SUBCUTANEOUS TISSUE: Status: ACTIVE | Noted: 2023-11-09

## 2023-11-09 PROBLEM — D22.71 MELANOCYTIC NEVI OF RIGHT LOWER LIMB, INCLUDING HIP: Status: ACTIVE | Noted: 2023-11-09

## 2023-11-09 PROBLEM — D22.5 MELANOCYTIC NEVI OF TRUNK: Status: ACTIVE | Noted: 2023-11-09

## 2023-11-09 PROCEDURE — OTHER PRESCRIPTION: OTHER

## 2023-11-09 PROCEDURE — 99214 OFFICE O/P EST MOD 30 MIN: CPT

## 2023-11-09 PROCEDURE — OTHER COSMETIC CONSULTATION: GENERAL: OTHER

## 2023-11-09 PROCEDURE — OTHER COUNSELING: OTHER

## 2023-11-09 PROCEDURE — OTHER ADDITIONAL NOTES: OTHER

## 2023-11-09 PROCEDURE — OTHER PRESCRIPTION MEDICATION MANAGEMENT: OTHER

## 2023-11-09 PROCEDURE — OTHER MIPS QUALITY: OTHER

## 2023-11-09 RX ORDER — PIMECROLIMUS 10 MG/G
1% CREAM TOPICAL BID
Qty: 30 | Refills: 3 | Status: ERX

## 2023-11-09 ASSESSMENT — LOCATION DETAILED DESCRIPTION DERM
LOCATION DETAILED: RIGHT VENTRAL DISTAL FOREARM
LOCATION DETAILED: LEFT CRUS OF HELIX
LOCATION DETAILED: LEFT UPPER CUTANEOUS LIP
LOCATION DETAILED: LEFT VENTRAL DISTAL FOREARM
LOCATION DETAILED: PERIUMBILICAL SKIN
LOCATION DETAILED: RIGHT ANTERIOR DISTAL THIGH
LOCATION DETAILED: LEFT MEDIAL UPPER BACK
LOCATION DETAILED: RIGHT VENTRAL PROXIMAL FOREARM
LOCATION DETAILED: LEFT VENTRAL PROXIMAL FOREARM
LOCATION DETAILED: LEFT ANTERIOR DISTAL THIGH
LOCATION DETAILED: LEFT ANTECUBITAL SKIN
LOCATION DETAILED: RIGHT CYMBA CONCHA
LOCATION DETAILED: LEFT INFERIOR CENTRAL MALAR CHEEK
LOCATION DETAILED: SUPERIOR THORACIC SPINE
LOCATION DETAILED: INFERIOR THORACIC SPINE
LOCATION DETAILED: LEFT INFERIOR LATERAL MIDBACK

## 2023-11-09 ASSESSMENT — LOCATION SIMPLE DESCRIPTION DERM
LOCATION SIMPLE: LEFT LIP
LOCATION SIMPLE: LEFT LOWER BACK
LOCATION SIMPLE: RIGHT THIGH
LOCATION SIMPLE: LEFT FOREARM
LOCATION SIMPLE: LEFT CHEEK
LOCATION SIMPLE: LEFT THIGH
LOCATION SIMPLE: LEFT UPPER BACK
LOCATION SIMPLE: LEFT UPPER ARM
LOCATION SIMPLE: ABDOMEN
LOCATION SIMPLE: RIGHT FOREARM
LOCATION SIMPLE: LEFT EAR
LOCATION SIMPLE: UPPER BACK
LOCATION SIMPLE: RIGHT EAR

## 2023-11-09 ASSESSMENT — LOCATION ZONE DERM
LOCATION ZONE: FACE
LOCATION ZONE: LEG
LOCATION ZONE: LIP
LOCATION ZONE: EAR
LOCATION ZONE: ARM
LOCATION ZONE: TRUNK
LOCATION ZONE: TRUNK

## 2023-11-09 NOTE — HPI: FULL BODY SKIN EXAMINATION
What Is The Reason For Today's Visit?: Full Body Skin Examination
What Is The Reason For Today's Visit? (Being Monitored For X): the development of new lesions
Additional History: Patient states she has a red dot on her left upper lip that has been present approximately two years. She denies any symptoms with it or any changes in size or shape.

## 2023-11-09 NOTE — PROCEDURE: PRESCRIPTION MEDICATION MANAGEMENT
Render In Strict Bullet Format?: No
Detail Level: Zone
Continue Regimen: Elidel 1 % topical cream BID

## 2024-11-11 ENCOUNTER — APPOINTMENT (OUTPATIENT)
Dept: URBAN - METROPOLITAN AREA CLINIC 256 | Age: 59
Setting detail: DERMATOLOGY
End: 2024-11-11

## 2024-11-11 VITALS — WEIGHT: 120 LBS | HEIGHT: 64 IN

## 2024-11-11 DIAGNOSIS — L82.1 OTHER SEBORRHEIC KERATOSIS: ICD-10-CM

## 2024-11-11 DIAGNOSIS — D22 MELANOCYTIC NEVI: ICD-10-CM

## 2024-11-11 DIAGNOSIS — L57.0 ACTINIC KERATOSIS: ICD-10-CM

## 2024-11-11 DIAGNOSIS — D18.0 HEMANGIOMA: ICD-10-CM

## 2024-11-11 DIAGNOSIS — Z71.89 OTHER SPECIFIED COUNSELING: ICD-10-CM

## 2024-11-11 DIAGNOSIS — L57.8 OTHER SKIN CHANGES DUE TO CHRONIC EXPOSURE TO NONIONIZING RADIATION: ICD-10-CM

## 2024-11-11 PROBLEM — D22.71 MELANOCYTIC NEVI OF RIGHT LOWER LIMB, INCLUDING HIP: Status: ACTIVE | Noted: 2024-11-11

## 2024-11-11 PROBLEM — D22.62 MELANOCYTIC NEVI OF LEFT UPPER LIMB, INCLUDING SHOULDER: Status: ACTIVE | Noted: 2024-11-11

## 2024-11-11 PROBLEM — D22.39 MELANOCYTIC NEVI OF OTHER PARTS OF FACE: Status: ACTIVE | Noted: 2024-11-11

## 2024-11-11 PROBLEM — D22.61 MELANOCYTIC NEVI OF RIGHT UPPER LIMB, INCLUDING SHOULDER: Status: ACTIVE | Noted: 2024-11-11

## 2024-11-11 PROBLEM — D22.5 MELANOCYTIC NEVI OF TRUNK: Status: ACTIVE | Noted: 2024-11-11

## 2024-11-11 PROBLEM — D18.01 HEMANGIOMA OF SKIN AND SUBCUTANEOUS TISSUE: Status: ACTIVE | Noted: 2024-11-11

## 2024-11-11 PROBLEM — D22.72 MELANOCYTIC NEVI OF LEFT LOWER LIMB, INCLUDING HIP: Status: ACTIVE | Noted: 2024-11-11

## 2024-11-11 PROCEDURE — OTHER PRESCRIPTION MEDICATION MANAGEMENT: OTHER

## 2024-11-11 PROCEDURE — OTHER COUNSELING: OTHER

## 2024-11-11 PROCEDURE — OTHER ADDITIONAL NOTES: OTHER

## 2024-11-11 PROCEDURE — OTHER MIPS QUALITY: OTHER

## 2024-11-11 PROCEDURE — OTHER PRESCRIPTION: OTHER

## 2024-11-11 PROCEDURE — 99214 OFFICE O/P EST MOD 30 MIN: CPT

## 2024-11-11 RX ORDER — FLUOROURACIL 2 G/40G
CREAM TOPICAL BID
Qty: 40 | Refills: 0 | Status: ERX | COMMUNITY
Start: 2024-11-11

## 2024-11-11 ASSESSMENT — LOCATION DETAILED DESCRIPTION DERM
LOCATION DETAILED: RIGHT ANTERIOR DISTAL THIGH
LOCATION DETAILED: LEFT VENTRAL PROXIMAL FOREARM
LOCATION DETAILED: LEFT INFERIOR CENTRAL MALAR CHEEK
LOCATION DETAILED: LEFT INFERIOR MEDIAL MIDBACK
LOCATION DETAILED: RIGHT DISTAL POSTERIOR UPPER ARM
LOCATION DETAILED: LEFT ANTERIOR PROXIMAL THIGH
LOCATION DETAILED: RIGHT DISTAL POSTERIOR THIGH
LOCATION DETAILED: LEFT DISTAL POSTERIOR THIGH
LOCATION DETAILED: RIGHT VENTRAL DISTAL FOREARM
LOCATION DETAILED: LEFT LATERAL ABDOMEN
LOCATION DETAILED: LEFT DISTAL POSTERIOR UPPER ARM

## 2024-11-11 ASSESSMENT — LOCATION SIMPLE DESCRIPTION DERM
LOCATION SIMPLE: RIGHT POSTERIOR THIGH
LOCATION SIMPLE: LEFT THIGH
LOCATION SIMPLE: RIGHT THIGH
LOCATION SIMPLE: RIGHT FOREARM
LOCATION SIMPLE: RIGHT UPPER ARM
LOCATION SIMPLE: LEFT UPPER ARM
LOCATION SIMPLE: ABDOMEN
LOCATION SIMPLE: LEFT LOWER BACK
LOCATION SIMPLE: LEFT FOREARM
LOCATION SIMPLE: LEFT POSTERIOR THIGH
LOCATION SIMPLE: LEFT CHEEK

## 2024-11-11 ASSESSMENT — LOCATION ZONE DERM
LOCATION ZONE: FACE
LOCATION ZONE: LEG
LOCATION ZONE: TRUNK
LOCATION ZONE: ARM

## 2024-11-11 NOTE — PROCEDURE: PRESCRIPTION MEDICATION MANAGEMENT
Render In Strict Bullet Format?: No
Initiate Treatment: Apply Efudex 5 % topical cream to the nose twice daily for 2 weeks. Continue application until follow up appointment.
Detail Level: Zone

## 2024-11-11 NOTE — PROCEDURE: ADDITIONAL NOTES
Render Risk Assessment In Note?: no
Additional Notes: - Very mild erythema noted still on the left upper lip. Discussed with patient to watch and monitor lesion due to the very slow progression seen today. We discussed the small chance of hypopigmentation with LN2 treatment. \\n- Discussed with patient alternatives to LN2 like Efudex for the nose. Patient would like to proceed with Efudex for the nose. Discussed with patient she will need to have a recheck appointment 2 weeks after starting the medication.
Detail Level: Simple

## 2024-11-11 NOTE — PROCEDURE: COUNSELING
Detail Level: Zone
Detail Level: Detailed
Patient Specific Counseling (Will Not Stick From Patient To Patient): Photos shown of treatment process and cosmetic issues with treatment

## 2024-12-18 ENCOUNTER — ANCILLARY PROCEDURE (OUTPATIENT)
Dept: MAMMOGRAPHY | Facility: CLINIC | Age: 59
End: 2024-12-18
Payer: COMMERCIAL

## 2024-12-18 DIAGNOSIS — Z12.31 SCREENING MAMMOGRAM FOR BREAST CANCER: ICD-10-CM

## 2024-12-18 PROCEDURE — 77063 BREAST TOMOSYNTHESIS BI: CPT | Mod: TC | Performed by: RADIOLOGY

## 2024-12-18 PROCEDURE — 77067 SCR MAMMO BI INCL CAD: CPT | Mod: TC | Performed by: RADIOLOGY

## 2025-01-10 ENCOUNTER — APPOINTMENT (OUTPATIENT)
Dept: URBAN - METROPOLITAN AREA CLINIC 256 | Age: 60
Setting detail: DERMATOLOGY
End: 2025-01-12

## 2025-01-10 DIAGNOSIS — L24.4 IRRITANT CONTACT DERMATITIS DUE TO DRUGS IN CONTACT WITH SKIN: ICD-10-CM

## 2025-01-10 PROCEDURE — OTHER MIPS QUALITY: OTHER

## 2025-01-10 PROCEDURE — OTHER PATIENT SPECIFIC COUNSELING: OTHER

## 2025-01-10 PROCEDURE — OTHER COUNSELING: OTHER

## 2025-01-10 PROCEDURE — OTHER PHOTO-DOCUMENTATION: OTHER

## 2025-01-10 PROCEDURE — 99213 OFFICE O/P EST LOW 20 MIN: CPT

## 2025-01-10 PROCEDURE — OTHER PRESCRIPTION MEDICATION MANAGEMENT: OTHER

## 2025-01-10 NOTE — PROCEDURE: PRESCRIPTION MEDICATION MANAGEMENT
Continue Regimen: Continue applying Efudex BID for 5 more days
Detail Level: Zone
Render In Strict Bullet Format?: No

## 2025-01-10 NOTE — PROCEDURE: PATIENT SPECIFIC COUNSELING
Detail Level: Zone
-Discussed that she should continue applying the cream as she did not get a big reaction from the therapy.  \\n-Reviewed post Efudex treatment of cool compresses and applying Vaseline

## 2025-01-17 NOTE — PROCEDURE: ADDITIONAL NOTES
Addended by: JESSI GUO on: 1/16/2025 06:34 PM     Modules accepted: Orders    
Detail Level: Simple
Render Risk Assessment In Note?: no
Additional Notes: - mild scaling noted on the left upper lip, advised to watch area for progression and RTC if lesion worsens. Patient states she has an event coming up and will defer treatment at this time.